# Patient Record
Sex: FEMALE | Race: WHITE | NOT HISPANIC OR LATINO | Employment: FULL TIME | URBAN - METROPOLITAN AREA
[De-identification: names, ages, dates, MRNs, and addresses within clinical notes are randomized per-mention and may not be internally consistent; named-entity substitution may affect disease eponyms.]

---

## 2020-11-30 ENCOUNTER — OFFICE VISIT (OUTPATIENT)
Dept: ENDOCRINOLOGY | Facility: CLINIC | Age: 35
End: 2020-11-30

## 2020-11-30 DIAGNOSIS — E10.65 TYPE 1 DIABETES MELLITUS WITH HYPERGLYCEMIA (HCC): Primary | ICD-10-CM

## 2020-11-30 PROCEDURE — 99442 PR PHYS/QHP TELEPHONE EVALUATION 11-20 MIN: CPT | Performed by: PHYSICIAN ASSISTANT

## 2020-11-30 RX ORDER — INSULIN GLARGINE 100 [IU]/ML
INJECTION, SOLUTION SUBCUTANEOUS
COMMUNITY
Start: 2020-10-31 | End: 2020-12-29 | Stop reason: SDUPTHER

## 2020-11-30 RX ORDER — INSULIN LISPRO 100 [IU]/ML
INJECTION, SOLUTION INTRAVENOUS; SUBCUTANEOUS
COMMUNITY
Start: 2020-10-04 | End: 2021-01-30 | Stop reason: SDUPTHER

## 2020-11-30 NOTE — PROGRESS NOTES
You have chosen to receive care through a telephone visit. Do you consent to use a telephone visit for your medical care today? Yes         Office Note      Date: 2020  Patient Name: Anisha Brantley  MRN: [id number]  : 1985    Chief Complaint   Patient presents with   • Diabetes       History of Present Illness:   Anisha Brantley is a 35 y.o. female who presents today for type 1 diabetes.  She has been participating in diabetes research studies.  She remains on basal/bolus insulin.  She has been using the Dexcom G6 off and on.  She reports this is expensive for her, but plans to continue.  She asks about other CGM options.  She asks about insulin pumps.  She is testing FSBS 4x per day if not using Dexcom.  She has noted higher BG.  She feels that at least part of this is due to stress.  She notes increasing BG in morning if she doesn't eat breakfast.  She reports having shingles about a month ago.  She denies any problems with her feet.  Eye exam past due.    Subjective      Review of Systems:   Review of Systems   Constitutional: Negative for appetite change, chills, fatigue, fever and unexpected weight change.   Respiratory: Negative for cough, shortness of breath and wheezing.    Cardiovascular: Negative for chest pain, palpitations and leg swelling.   Gastrointestinal: Negative for abdominal pain, constipation, diarrhea, nausea and vomiting.   Endocrine: Negative for cold intolerance, heat intolerance, polydipsia, polyphagia and polyuria.   Neurological: Negative for tremors, syncope, weakness, numbness and headaches.       The following portions of the patient's history were reviewed and updated as appropriate: allergies, current medications, past family history, past medical history, past social history, past surgical history and problem list.    Objective     There were no vitals filed for this visit.  There is no height or weight on file to calculate BMI.    Physical Exam      Current Outpatient  Medications   Medication Instructions   • HumaLOG KwikPen 100 UNIT/ML solution pen-injector Inject 1 unit per 5-6g carbs with meals, correction 1 unit per 25 above 120; max 30 units daily   • Lantus SoloStar 100 UNIT/ML injection pen 25 units at bedtime       Assessment / Plan      Assessment & Plan:  1. Type 1 diabetes mellitus with hyperglycemia (CMS/[facility])  She has noted more hyperglycemia.  Fasting BG okay.  She will try to eat breakfast consistently.  Discussed current insulin pumps, CGM, and hybrid closed-loop systems.  She will consider options.  She will continue Dexcom for now.  Will get her set up for sharing with our clinic through [facility].  Check labs - she will come in fasting within the next couple of weeks.  Will notify her of results.  - Comprehensive Metabolic Panel; Future  - Microalbumin / Creatinine Urine Ratio - Urine, Clean Catch; Future  - Lipid Panel; Future  - Hemoglobin A1c; Future  - TSH; Future      20 minutes spent on phone with patient.     Return in about 3 months (around 2/28/2021) for Next scheduled follow up.     VITA Sky  11/30/2020

## 2020-12-29 RX ORDER — INSULIN GLARGINE 100 [IU]/ML
30 INJECTION, SOLUTION SUBCUTANEOUS NIGHTLY
Qty: 15 ML | Refills: 1 | Status: SHIPPED | OUTPATIENT
Start: 2020-12-29 | End: 2021-01-08 | Stop reason: SDUPTHER

## 2021-01-08 RX ORDER — INSULIN GLARGINE 100 [IU]/ML
30 INJECTION, SOLUTION SUBCUTANEOUS NIGHTLY
Qty: 15 ML | Refills: 1 | Status: SHIPPED | OUTPATIENT
Start: 2021-01-08 | End: 2021-07-21

## 2021-01-30 DIAGNOSIS — E10.65 TYPE 1 DIABETES MELLITUS WITH HYPERGLYCEMIA (HCC): Primary | Chronic | ICD-10-CM

## 2021-01-30 RX ORDER — INSULIN LISPRO 100 [IU]/ML
INJECTION, SOLUTION INTRAVENOUS; SUBCUTANEOUS
Qty: 15 ML | Refills: 5 | Status: SHIPPED | OUTPATIENT
Start: 2021-01-30 | End: 2021-12-20

## 2021-05-26 RX ORDER — PROCHLORPERAZINE 25 MG/1
SUPPOSITORY RECTAL
Qty: 3 EACH | Refills: 2 | Status: SHIPPED | OUTPATIENT
Start: 2021-05-26 | End: 2021-10-01 | Stop reason: SDUPTHER

## 2021-06-01 RX ORDER — PROCHLORPERAZINE 25 MG/1
1 SUPPOSITORY RECTAL
Qty: 1 EACH | Refills: 0 | Status: SHIPPED | OUTPATIENT
Start: 2021-06-01 | End: 2021-10-01 | Stop reason: SDUPTHER

## 2021-06-08 ENCOUNTER — OFFICE VISIT (OUTPATIENT)
Dept: ENDOCRINOLOGY | Facility: CLINIC | Age: 36
End: 2021-06-08

## 2021-06-08 VITALS
OXYGEN SATURATION: 98 % | BODY MASS INDEX: 21.33 KG/M2 | HEART RATE: 100 BPM | DIASTOLIC BLOOD PRESSURE: 62 MMHG | SYSTOLIC BLOOD PRESSURE: 124 MMHG | HEIGHT: 69 IN | WEIGHT: 144 LBS

## 2021-06-08 DIAGNOSIS — E10.65 TYPE 1 DIABETES MELLITUS WITH HYPERGLYCEMIA (HCC): Primary | ICD-10-CM

## 2021-06-08 DIAGNOSIS — E10.65 UNCONTROLLED TYPE 1 DIABETES MELLITUS WITH HYPERGLYCEMIA (HCC): ICD-10-CM

## 2021-06-08 LAB
EXPIRATION DATE: ABNORMAL
EXPIRATION DATE: NORMAL
GLUCOSE BLDC GLUCOMTR-MCNC: 222 MG/DL (ref 70–130)
HBA1C MFR BLD: 9.4 %
Lab: ABNORMAL
Lab: NORMAL

## 2021-06-08 PROCEDURE — 82947 ASSAY GLUCOSE BLOOD QUANT: CPT | Performed by: INTERNAL MEDICINE

## 2021-06-08 PROCEDURE — 83036 HEMOGLOBIN GLYCOSYLATED A1C: CPT | Performed by: INTERNAL MEDICINE

## 2021-06-08 PROCEDURE — 99214 OFFICE O/P EST MOD 30 MIN: CPT | Performed by: INTERNAL MEDICINE

## 2021-06-08 NOTE — ASSESSMENT & PLAN NOTE
a1c is worse  She  Needs to use a sensor more consistently. So I gave her sample of  Jessica to use.

## 2021-06-08 NOTE — PROGRESS NOTES
"     Office Note      Date: 2021  Patient Name: Anisha Brantley  MRN: [id number]  : 1985    Chief Complaint   Patient presents with   • Diabetes       History of Present Illness:   Anisha Brantley is a 35 y.o. female who presents for Diabetes - type 1    Hemoglobin A1C   Date Value Ref Range Status   2021 9.4 % Final     She is taking at least 4 shots per day.  She uses dexcom to check bg 288 times per day.  But  It is 320$ per month and she cannot afford to do that all the time.  She is unable to tell when her sugars are low.  She  Has lows after meals when she  Takes extra insulin after meals.    She is due for an eye exam.  She  Is scheduled for fasting  Labs in the fall  Feet are fine.     Subjective            Review of Systems:   Review of Systems   Constitutional: Negative.    HENT: Negative.    Eyes: Negative.    Respiratory: Negative.    Cardiovascular: Negative.    Gastrointestinal: Negative.        The following portions of the patient's history were reviewed and updated as appropriate: allergies, current medications, past family history, past medical history, past social history, past surgical history and problem list.    Objective     Visit Vitals  /62 (BP Location: Left arm, Patient Position: Sitting, Cuff Size: Adult)   Pulse 100   Ht 175.3 cm (69\")   Wt 65.3 kg (144 lb)   SpO2 98%   BMI 21.27 kg/m²       Labs:    CMP  No results found for: GLUCOSE, BUN, CREATININE, EGFRIFNONA, EGFRIFAFRI, BCR, K, CO2, CALCIUM, PROTENTOTREF, LABIL2, BILIRUBIN, AST, ALT     CBC w/DIFF  No results found for: WBC, RBC, HGB, HCT, MCV, MCH, MCHC, RDW, RDWSD, MPV, PLT, NEUTRORELPCT, LYMPHORELPCT, MONORELPCT, EOSRELPCT, BASORELPCT, AUTOIGPER, NEUTROABS, LYMPHSABS, MONOSABS, EOSABS, BASOSABS, AUTOIGNUM, NRBC    Physical Exam:  Physical Exam  Vitals reviewed.   Constitutional:       Appearance: Normal appearance.   Neurological:      Mental Status: She is alert.   Psychiatric:         Thought Content: " Thought content normal.         Judgment: Judgment normal.         Assessment / Plan      Assessment & Plan:  Problem List Items Addressed This Visit        Other    RESOLVED: Type 1 diabetes mellitus (CMS/[facility]) - Primary (Chronic)    Overview     Diagnosed 2004         Relevant Medications    Lantus SoloStar 100 UNIT/ML injection pen    HumaLOG KwikPen 100 UNIT/ML solution pen-injector    Other Relevant Orders    POC Glycosylated Hemoglobin (Hb A1C) (Completed)    POC Glucose, Blood (Completed)    Uncontrolled type 1 diabetes mellitus with hyperglycemia (CMS/[facility])    Current Assessment & Plan     a1c is worse  She  Needs to use a sensor more consistently. So I gave her sample of  Jessica to use.           Relevant Medications    Lantus SoloStar 100 UNIT/ML injection pen    HumaLOG KwikPen 100 UNIT/ML solution pen-injector           Guzman Vallejo MD   06/08/2021

## 2021-06-10 ENCOUNTER — TELEPHONE (OUTPATIENT)
Dept: ENDOCRINOLOGY | Facility: CLINIC | Age: 36
End: 2021-06-10

## 2021-06-10 RX ORDER — FLUCONAZOLE 150 MG/1
150 TABLET ORAL ONCE
Qty: 1 TABLET | Refills: 0 | Status: SHIPPED | OUTPATIENT
Start: 2021-06-10 | End: 2021-06-10

## 2021-06-10 NOTE — TELEPHONE ENCOUNTER
PATIENT IS REQUESTING AN RX FOR DIFLUCAN. SHE STATES THAT SHE USED TO BE PRESCRIBED THIS BY DR JACKSON BUT NEVER USED IT SO DIDN'T REFILL BUT IS NEEDING IT NOW. ELVIS SHEETS

## 2021-06-14 ENCOUNTER — TELEPHONE (OUTPATIENT)
Dept: ENDOCRINOLOGY | Facility: CLINIC | Age: 36
End: 2021-06-14

## 2021-06-14 NOTE — TELEPHONE ENCOUNTER
PT CALLED STATING THAT THE DIFLUCAN WE SENT IN IS NOT WORKING. PT REQUESTED TO SPEAK W/ SOMEONE IN REGARDS TO THIS. PLEASE REACH OUT TO PT AT EARLIEST CONVENIENCE. THANK YOU

## 2021-07-16 NOTE — TELEPHONE ENCOUNTER
PLEASE CALL IN THE [facility] IN [place]    PTS NUMBER IF YOU HAVE QUESTIONS  [phone]

## 2021-07-21 RX ORDER — INSULIN GLARGINE 100 [IU]/ML
30 INJECTION, SOLUTION SUBCUTANEOUS NIGHTLY
Qty: 15 ML | Refills: 5 | Status: SHIPPED | OUTPATIENT
Start: 2021-07-21 | End: 2022-08-24

## 2021-07-21 NOTE — TELEPHONE ENCOUNTER
PT CALLED IN REGARDS TO REQUEST FROM [facility] FOR LANTUS PENS. PT STATED SHE IS OUT OF MEDS. PLEASE SEND IN AT EARLIEST CONVENIENCE. THANK YOU

## 2021-10-01 ENCOUNTER — PRIOR AUTHORIZATION (OUTPATIENT)
Dept: ENDOCRINOLOGY | Facility: CLINIC | Age: 36
End: 2021-10-01

## 2021-10-01 ENCOUNTER — TELEPHONE (OUTPATIENT)
Dept: ENDOCRINOLOGY | Facility: CLINIC | Age: 36
End: 2021-10-01

## 2021-10-01 RX ORDER — PROCHLORPERAZINE 25 MG/1
SUPPOSITORY RECTAL
Qty: 3 EACH | Refills: 2 | Status: SHIPPED | OUTPATIENT
Start: 2021-10-01 | End: 2022-08-05

## 2021-10-01 RX ORDER — PROCHLORPERAZINE 25 MG/1
1 SUPPOSITORY RECTAL SEE ADMIN INSTRUCTIONS
Qty: 1 EACH | Refills: 0 | Status: SHIPPED | OUTPATIENT
Start: 2021-10-01

## 2021-10-01 RX ORDER — PROCHLORPERAZINE 25 MG/1
1 SUPPOSITORY RECTAL
Qty: 1 EACH | Refills: 0 | Status: SHIPPED | OUTPATIENT
Start: 2021-10-01 | End: 2022-02-08

## 2021-10-01 NOTE — TELEPHONE ENCOUNTER
PATIENT NEEDS PRIOR AUTH FOR DEXCOM G6. HER INSURANCE WILL COVER THE DEXCOM G6 WITH PRIOR AUTH FOR ALL THE DEXCOM SUPPLIES. PHONE NUMBER [phone]

## 2021-10-01 NOTE — TELEPHONE ENCOUNTER
Approvedtoday  PA Case: [id number], Status: Approved, Coverage Starts on: 10/1/2021 12:00:00 AM, Coverage Ends on: 10/1/2022 12:00:00 AM.  Drug  Dexcom G6  device  Form  [employer] Form (2017 NCPDP)    Available without authorization.  Drug  Dexcom G6 Transmitter  Form  [employer] Electronic PA Form (2017 NCPDP)    Available without authorization.  Drug  Dexcom G6 Sensor  Form  [employer] Form (2017 NCPDP)

## 2021-10-04 ENCOUNTER — LAB (OUTPATIENT)
Dept: LAB | Facility: HOSPITAL | Age: 36
End: 2021-10-04

## 2021-10-04 ENCOUNTER — OFFICE VISIT (OUTPATIENT)
Dept: ENDOCRINOLOGY | Facility: CLINIC | Age: 36
End: 2021-10-04

## 2021-10-04 VITALS
SYSTOLIC BLOOD PRESSURE: 110 MMHG | WEIGHT: 146 LBS | HEIGHT: 69 IN | HEART RATE: 98 BPM | DIASTOLIC BLOOD PRESSURE: 80 MMHG | OXYGEN SATURATION: 98 % | BODY MASS INDEX: 21.62 KG/M2

## 2021-10-04 DIAGNOSIS — E10.65 UNCONTROLLED TYPE 1 DIABETES MELLITUS WITH HYPERGLYCEMIA (HCC): ICD-10-CM

## 2021-10-04 DIAGNOSIS — E10.65 TYPE 1 DIABETES MELLITUS WITH HYPERGLYCEMIA (HCC): ICD-10-CM

## 2021-10-04 DIAGNOSIS — E10.65 UNCONTROLLED TYPE 1 DIABETES MELLITUS WITH HYPERGLYCEMIA (HCC): Primary | ICD-10-CM

## 2021-10-04 LAB
EXPIRATION DATE: ABNORMAL
EXPIRATION DATE: NORMAL
GLUCOSE BLDC GLUCOMTR-MCNC: 161 MG/DL (ref 70–130)
HBA1C MFR BLD: 9 %
Lab: ABNORMAL
Lab: NORMAL

## 2021-10-04 PROCEDURE — 99213 OFFICE O/P EST LOW 20 MIN: CPT | Performed by: INTERNAL MEDICINE

## 2021-10-04 PROCEDURE — 82947 ASSAY GLUCOSE BLOOD QUANT: CPT | Performed by: INTERNAL MEDICINE

## 2021-10-04 PROCEDURE — 82570 ASSAY OF URINE CREATININE: CPT

## 2021-10-04 PROCEDURE — 84443 ASSAY THYROID STIM HORMONE: CPT

## 2021-10-04 PROCEDURE — 82043 UR ALBUMIN QUANTITATIVE: CPT

## 2021-10-04 PROCEDURE — 2033F EYE IMG VALID W/O RTNOPTHY: CPT | Performed by: INTERNAL MEDICINE

## 2021-10-04 PROCEDURE — 80053 COMPREHEN METABOLIC PANEL: CPT

## 2021-10-04 PROCEDURE — 83036 HEMOGLOBIN GLYCOSYLATED A1C: CPT | Performed by: INTERNAL MEDICINE

## 2021-10-04 PROCEDURE — 92250 FUNDUS PHOTOGRAPHY W/I&R: CPT | Performed by: INTERNAL MEDICINE

## 2021-10-04 NOTE — PROGRESS NOTES
"     Office Note      Date: 10/04/2021  Patient Name: Anisha Brantley  MRN: [id number]  : 1985    Chief Complaint   Patient presents with   • Diabetes       History of Present Illness:   Anisha Brantley is a 35 y.o. female who presents for Diabetes - type 1.  She just got started back on her dexcom.  She is  Really stressed. Her  has severe [place] that  Is not responding to treatment.    Last A1c:  Hemoglobin A1C   Date Value Ref Range Status   10/04/2021 9.0 % Final       Changes in health since last visit: see above . Last eye exam due - does not have an eye doctor. .    Subjective       Review of Systems:   Review of Systems   Constitutional: Negative.    HENT: Negative.    Eyes: Negative.    Respiratory: Negative.        The following portions of the patient's history were reviewed and updated as appropriate: allergies, current medications, past family history, past medical history, past social history, past surgical history and problem list.    Objective     Visit Vitals  /80   Pulse 98   Ht 175.3 cm (69\")   Wt 66.2 kg (146 lb)   SpO2 98%   BMI 21.56 kg/m²       Labs:    CMP  No results found for: GLUCOSE, BUN, CREATININE, EGFRIFNONA, EGFRIFAFRI, BCR, K, CO2, CALCIUM, PROTENTOTREF, LABIL2, BILIRUBIN, AST, ALT     CBC w/DIFF  No results found for: WBC, RBC, HGB, HCT, MCV, MCH, MCHC, RDW, RDWSD, MPV, PLT, NEUTRORELPCT, LYMPHORELPCT, MONORELPCT, EOSRELPCT, BASORELPCT, AUTOIGPER, NEUTROABS, LYMPHSABS, MONOSABS, EOSABS, BASOSABS, AUTOIGNUM, NRBC    Physical Exam:  Physical Exam  Vitals reviewed.   Constitutional:       Appearance: Normal appearance. She is normal weight.   Cardiovascular:      Pulses:           Dorsalis pedis pulses are 2+ on the right side and 2+ on the left side.        Posterior tibial pulses are 2+ on the right side and 2+ on the left side.   Musculoskeletal:      Right foot: Normal range of motion. No deformity, bunion, Charcot foot, foot drop or prominent metatarsal " heads.      Left foot: Normal range of motion. No deformity, bunion, Charcot foot, foot drop or prominent metatarsal heads.   Feet:      Right foot:      Protective Sensation: 5 sites tested. 5 sites sensed.      Skin integrity: Skin integrity normal.      Toenail Condition: Right toenails are normal.      Left foot:      Protective Sensation: 5 sites tested. 5 sites sensed.      Skin integrity: Skin integrity normal.      Toenail Condition: Left toenails are normal.      Comments: Diabetic Foot Exam Performed and Monofilament Test Performed    Neurological:      Mental Status: She is alert.   Psychiatric:         Mood and Affect: Mood normal.         Thought Content: Thought content normal.         Judgment: Judgment normal.          Assessment / Plan      Assessment & Plan:  Problem List Items Addressed This Visit        Other    Uncontrolled type 1 diabetes mellitus with hyperglycemia (HCC) - Primary    Current Assessment & Plan     Diabetes is improving with treatment.   Continue current treatment regimen.  Diabetes will be reassessed in 6 months     Will get retinal photo today .         Relevant Medications    HumaLOG KwikPen 100 UNIT/ML solution pen-injector    Lantus SoloStar 100 UNIT/ML injection pen    Other Relevant Orders    POC Glycosylated Hemoglobin (Hb A1C) (Completed)    POC Glucose, Blood (Completed)    Comprehensive Metabolic Panel    Microalbumin / Creatinine Urine Ratio - Urine, Clean Catch    TSH           [place] Dakota Vallejo MD   10/04/2021

## 2021-10-04 NOTE — ASSESSMENT & PLAN NOTE
Diabetes is improving with treatment.   Continue current treatment regimen.  Diabetes will be reassessed in 6 months     Will get retinal photo today . Addendum- it was normal

## 2021-10-05 LAB
ALBUMIN SERPL-MCNC: 4.5 G/DL (ref 3.5–5.2)
ALBUMIN UR-MCNC: <1.2 MG/DL
ALBUMIN/GLOB SERPL: 2 G/DL
ALP SERPL-CCNC: 62 U/L (ref 39–117)
ALT SERPL W P-5'-P-CCNC: 12 U/L (ref 1–33)
ANION GAP SERPL CALCULATED.3IONS-SCNC: 9.5 MMOL/L (ref 5–15)
AST SERPL-CCNC: 14 U/L (ref 1–32)
BILIRUB SERPL-MCNC: 1.7 MG/DL (ref 0–1.2)
BUN SERPL-MCNC: 14 MG/DL (ref 6–20)
BUN/CREAT SERPL: 18.7 (ref 7–25)
CALCIUM SPEC-SCNC: 9.1 MG/DL (ref 8.6–10.5)
CHLORIDE SERPL-SCNC: 101 MMOL/L (ref 98–107)
CO2 SERPL-SCNC: 28.5 MMOL/L (ref 22–29)
CREAT SERPL-MCNC: 0.75 MG/DL (ref 0.57–1)
CREAT UR-MCNC: 75.6 MG/DL
GFR SERPL CREATININE-BSD FRML MDRD: 87 ML/MIN/1.73
GLOBULIN UR ELPH-MCNC: 2.3 GM/DL
GLUCOSE SERPL-MCNC: 97 MG/DL (ref 65–99)
MICROALBUMIN/CREAT UR: NORMAL MG/G{CREAT}
POTASSIUM SERPL-SCNC: 3.8 MMOL/L (ref 3.5–5.2)
PROT SERPL-MCNC: 6.8 G/DL (ref 6–8.5)
SODIUM SERPL-SCNC: 139 MMOL/L (ref 136–145)
TSH SERPL DL<=0.05 MIU/L-ACNC: 2.99 UIU/ML (ref 0.27–4.2)

## 2021-12-18 DIAGNOSIS — E10.65 TYPE 1 DIABETES MELLITUS WITH HYPERGLYCEMIA (HCC): Chronic | ICD-10-CM

## 2021-12-20 RX ORDER — INSULIN LISPRO 100 [IU]/ML
INJECTION, SOLUTION INTRAVENOUS; SUBCUTANEOUS
Qty: 15 ML | Refills: 5 | Status: SHIPPED | OUTPATIENT
Start: 2021-12-20 | End: 2022-12-06 | Stop reason: SDUPTHER

## 2022-02-08 RX ORDER — PROCHLORPERAZINE 25 MG/1
SUPPOSITORY RECTAL
Qty: 1 EACH | Refills: 0 | Status: SHIPPED | OUTPATIENT
Start: 2022-02-08 | End: 2022-06-13

## 2022-04-04 ENCOUNTER — OFFICE VISIT (OUTPATIENT)
Dept: ENDOCRINOLOGY | Facility: CLINIC | Age: 37
End: 2022-04-04

## 2022-04-04 VITALS
HEART RATE: 78 BPM | DIASTOLIC BLOOD PRESSURE: 71 MMHG | OXYGEN SATURATION: 98 % | BODY MASS INDEX: 21.33 KG/M2 | WEIGHT: 144 LBS | SYSTOLIC BLOOD PRESSURE: 112 MMHG | HEIGHT: 69 IN

## 2022-04-04 DIAGNOSIS — E10.65 UNCONTROLLED TYPE 1 DIABETES MELLITUS WITH HYPERGLYCEMIA: Primary | ICD-10-CM

## 2022-04-04 LAB
EXPIRATION DATE: ABNORMAL
EXPIRATION DATE: NORMAL
GLUCOSE BLDC GLUCOMTR-MCNC: 131 MG/DL (ref 70–130)
HBA1C MFR BLD: 9.5 %
Lab: ABNORMAL
Lab: NORMAL

## 2022-04-04 PROCEDURE — 82947 ASSAY GLUCOSE BLOOD QUANT: CPT | Performed by: INTERNAL MEDICINE

## 2022-04-04 PROCEDURE — 83036 HEMOGLOBIN GLYCOSYLATED A1C: CPT | Performed by: INTERNAL MEDICINE

## 2022-04-04 PROCEDURE — 99213 OFFICE O/P EST LOW 20 MIN: CPT | Performed by: INTERNAL MEDICINE

## 2022-04-04 NOTE — PROGRESS NOTES
"     Office Note      Date: 2022  Patient Name: Anisha Brantley  MRN: [id number]  : 1985    Chief Complaint   Patient presents with   • Diabetes       History of Present Illness:   Anisha Brantley is a 36 y.o. female who presents for Diabetes type 1  She takes 4 shots per day.  Bg is check 288 times per day with dexcom.  Insulin doses are adjusted frequently based upon the readings.  Patient has occasional hypoglycemia.  Patient has been using the system during the last 3 months.  She left her reader at home   She feels bg readings are too low at night and too high after meals   Last A1c:  Hemoglobin A1C   Date Value Ref Range Status   2022 9.5 % Final       Changes in health since last visit:  None . Last eye exam  About due .    Subjective          Review of Systems:   Review of Systems   Constitutional: Negative.    HENT: Negative.    Eyes: Negative.    Respiratory: Negative.        The following portions of the patient's history were reviewed and updated as appropriate: allergies, current medications, past family history, past medical history, past social history, past surgical history and problem list.    Objective     Visit Vitals  /71   Pulse 78   Ht 175.3 cm (69\")   Wt 65.3 kg (144 lb)   SpO2 98%   BMI 21.27 kg/m²       Labs:    CMP  Lab Results   Component Value Date    GLUCOSE 97 10/04/2021    BUN 14 10/04/2021    CREATININE 0.75 10/04/2021    EGFRIFNONA 87 10/04/2021    BCR 18.7 10/04/2021    K 3.8 10/04/2021    CO2 28.5 10/04/2021    CALCIUM 9.1 10/04/2021    AST 14 10/04/2021    ALT 12 10/04/2021        CBC w/DIFF  No results found for: WBC, RBC, HGB, HCT, MCV, MCH, MCHC, RDW, RDWSD, MPV, PLT, NEUTRORELPCT, LYMPHORELPCT, MONORELPCT, EOSRELPCT, BASORELPCT, AUTOIGPER, NEUTROABS, LYMPHSABS, MONOSABS, EOSABS, BASOSABS, AUTOIGNUM, NRBC    Physical Exam:  Physical Exam  Vitals reviewed.   Constitutional:       Appearance: Normal appearance.   Neurological:      Mental Status: She is " alert.   Psychiatric:         Mood and Affect: Mood normal.         Thought Content: Thought content normal.         Judgment: Judgment normal.          Assessment / Plan      Assessment & Plan:  Problem List Items Addressed This Visit        Other    Uncontrolled type 1 diabetes mellitus with hyperglycemia (HCC) - Primary    Current Assessment & Plan     Diabetes is unchanged.   insulin was adjusted   Diabetes will be reassessed in 6 months.           Relevant Medications    Lantus SoloStar 100 UNIT/ML injection pen    HumaLOG KwikPen 100 UNIT/ML solution pen-injector    Other Relevant Orders    POC Glucose, Blood (Completed)    POC Glycosylated Hemoglobin (Hb A1C) (Completed)           Guzman Vallejo MD   04/04/2022

## 2022-06-13 RX ORDER — PROCHLORPERAZINE 25 MG/1
SUPPOSITORY RECTAL
Qty: 1 EACH | Refills: 0 | Status: SHIPPED | OUTPATIENT
Start: 2022-06-13 | End: 2022-10-13

## 2022-08-05 RX ORDER — PROCHLORPERAZINE 25 MG/1
SUPPOSITORY RECTAL
Qty: 3 EACH | Refills: 2 | Status: SHIPPED | OUTPATIENT
Start: 2022-08-05 | End: 2023-03-13

## 2022-08-24 RX ORDER — INSULIN GLARGINE 100 [IU]/ML
30 INJECTION, SOLUTION SUBCUTANEOUS NIGHTLY
Qty: 15 ML | Refills: 2 | Status: SHIPPED | OUTPATIENT
Start: 2022-08-24 | End: 2022-10-12

## 2022-10-12 ENCOUNTER — LAB (OUTPATIENT)
Dept: LAB | Facility: HOSPITAL | Age: 37
End: 2022-10-12

## 2022-10-12 ENCOUNTER — OFFICE VISIT (OUTPATIENT)
Dept: ENDOCRINOLOGY | Facility: CLINIC | Age: 37
End: 2022-10-12

## 2022-10-12 VITALS
HEIGHT: 69 IN | OXYGEN SATURATION: 98 % | SYSTOLIC BLOOD PRESSURE: 104 MMHG | HEART RATE: 112 BPM | BODY MASS INDEX: 20.14 KG/M2 | WEIGHT: 136 LBS | DIASTOLIC BLOOD PRESSURE: 78 MMHG

## 2022-10-12 DIAGNOSIS — E10.65 UNCONTROLLED TYPE 1 DIABETES MELLITUS WITH HYPERGLYCEMIA: Primary | ICD-10-CM

## 2022-10-12 DIAGNOSIS — E10.65 UNCONTROLLED TYPE 1 DIABETES MELLITUS WITH HYPERGLYCEMIA: ICD-10-CM

## 2022-10-12 LAB
ALBUMIN SERPL-MCNC: 4.2 G/DL (ref 3.5–5.2)
ALBUMIN UR-MCNC: <1.2 MG/DL
ALBUMIN/GLOB SERPL: 1.8 G/DL
ALP SERPL-CCNC: 92 U/L (ref 39–117)
ALT SERPL W P-5'-P-CCNC: 16 U/L (ref 1–33)
ANION GAP SERPL CALCULATED.3IONS-SCNC: 9 MMOL/L (ref 5–15)
AST SERPL-CCNC: 16 U/L (ref 1–32)
BILIRUB SERPL-MCNC: 0.8 MG/DL (ref 0–1.2)
BUN SERPL-MCNC: 11 MG/DL (ref 6–20)
BUN/CREAT SERPL: 14.1 (ref 7–25)
CALCIUM SPEC-SCNC: 8.9 MG/DL (ref 8.6–10.5)
CHLORIDE SERPL-SCNC: 98 MMOL/L (ref 98–107)
CHOLEST SERPL-MCNC: 160 MG/DL (ref 0–200)
CO2 SERPL-SCNC: 26 MMOL/L (ref 22–29)
CREAT SERPL-MCNC: 0.78 MG/DL (ref 0.57–1)
CREAT UR-MCNC: 48.6 MG/DL
EGFRCR SERPLBLD CKD-EPI 2021: 100.5 ML/MIN/1.73
EXPIRATION DATE: ABNORMAL
EXPIRATION DATE: NORMAL
GLOBULIN UR ELPH-MCNC: 2.4 GM/DL
GLUCOSE BLDC GLUCOMTR-MCNC: 425 MG/DL (ref 70–130)
GLUCOSE SERPL-MCNC: 402 MG/DL (ref 65–99)
HBA1C MFR BLD: 11 %
HDLC SERPL-MCNC: 79 MG/DL (ref 40–60)
LDLC SERPL CALC-MCNC: 70 MG/DL (ref 0–100)
LDLC/HDLC SERPL: 0.89 {RATIO}
Lab: ABNORMAL
Lab: NORMAL
MICROALBUMIN/CREAT UR: NORMAL MG/G{CREAT}
POTASSIUM SERPL-SCNC: 4.4 MMOL/L (ref 3.5–5.2)
PROT SERPL-MCNC: 6.6 G/DL (ref 6–8.5)
SODIUM SERPL-SCNC: 133 MMOL/L (ref 136–145)
TRIGL SERPL-MCNC: 54 MG/DL (ref 0–150)
TSH SERPL DL<=0.05 MIU/L-ACNC: 3.77 UIU/ML (ref 0.27–4.2)
VLDLC SERPL-MCNC: 11 MG/DL (ref 5–40)

## 2022-10-12 PROCEDURE — 82043 UR ALBUMIN QUANTITATIVE: CPT

## 2022-10-12 PROCEDURE — 82947 ASSAY GLUCOSE BLOOD QUANT: CPT | Performed by: INTERNAL MEDICINE

## 2022-10-12 PROCEDURE — 83036 HEMOGLOBIN GLYCOSYLATED A1C: CPT | Performed by: INTERNAL MEDICINE

## 2022-10-12 PROCEDURE — 82570 ASSAY OF URINE CREATININE: CPT

## 2022-10-12 PROCEDURE — 80061 LIPID PANEL: CPT

## 2022-10-12 PROCEDURE — 99214 OFFICE O/P EST MOD 30 MIN: CPT | Performed by: INTERNAL MEDICINE

## 2022-10-12 PROCEDURE — 80053 COMPREHEN METABOLIC PANEL: CPT

## 2022-10-12 PROCEDURE — 84443 ASSAY THYROID STIM HORMONE: CPT

## 2022-10-12 RX ORDER — INSULIN PMP CART,AUT,G6/7,CNTR
1 EACH SUBCUTANEOUS TAKE AS DIRECTED
Qty: 1 KIT | Refills: 0 | Status: SHIPPED | OUTPATIENT
Start: 2022-10-12

## 2022-10-12 RX ORDER — INSULIN GLARGINE 100 [IU]/ML
20 INJECTION, SOLUTION SUBCUTANEOUS 2 TIMES DAILY
Qty: 15 ML | Refills: 2 | Status: SHIPPED | OUTPATIENT
Start: 2022-10-12

## 2022-10-12 NOTE — ASSESSMENT & PLAN NOTE
Deteriorate  For now she needs to take more insulin.   Will split lantus and increase dose  Will start process to get omniopod pump

## 2022-10-12 NOTE — PROGRESS NOTES
"     Office Note      Date: 10/12/2022  Patient Name: Anisha Brantley  MRN: [id number]  : 1985    Chief Complaint   Patient presents with   • Diabetes       History of Present Illness:   Anisha Brantley is a 36 y.o. female who presents for Diabetes - type 1  Rx: insulin - 4 shots per day  She wears her dexcom sporadically.  When she wears it she notes persistent hyperglycemia  .     Last A1c:  Hemoglobin A1C   Date Value Ref Range Status   10/12/2022 11.0 % Final       Changes in health since last visit: none . Last eye exam up to date.    Subjective          Review of Systems:   Review of Systems   Constitutional: Positive for unexpected weight change.   HENT: Negative.    Eyes: Negative for visual disturbance.   Endocrine: Positive for polydipsia. Negative for polyuria.       The following portions of the patient's history were reviewed and updated as appropriate: allergies, current medications, past family history, past medical history, past social history, past surgical history and problem list.    Objective     Visit Vitals  /78   Pulse 112   Ht 175.3 cm (69\")   Wt 61.7 kg (136 lb)   SpO2 98%   BMI 20.08 kg/m²       Labs:    CMP  Lab Results   Component Value Date    GLUCOSE 97 10/04/2021    BUN 14 10/04/2021    CREATININE 0.75 10/04/2021    EGFRIFNONA 87 10/04/2021    BCR 18.7 10/04/2021    K 3.8 10/04/2021    CO2 28.5 10/04/2021    CALCIUM 9.1 10/04/2021    AST 14 10/04/2021    ALT 12 10/04/2021        CBC w/DIFF  No results found for: WBC, RBC, HGB, HCT, MCV, MCH, MCHC, RDW, RDWSD, MPV, PLT, NEUTRORELPCT, LYMPHORELPCT, MONORELPCT, EOSRELPCT, BASORELPCT, AUTOIGPER, NEUTROABS, LYMPHSABS, MONOSABS, EOSABS, BASOSABS, AUTOIGNUM, NRBC    Physical Exam:  Physical Exam  Vitals reviewed.   Constitutional:       Appearance: Normal appearance. She is normal weight.   Cardiovascular:      Pulses:           Dorsalis pedis pulses are 2+ on the right side and 2+ on the left side.        Posterior tibial pulses " are 2+ on the right side and 2+ on the left side.   Musculoskeletal:      Right foot: Normal range of motion. No deformity, bunion, Charcot foot, foot drop or prominent metatarsal heads.      Left foot: Normal range of motion. No deformity, bunion, Charcot foot, foot drop or prominent metatarsal heads.   Feet:      Right foot:      Protective Sensation: 5 sites tested. 5 sites sensed.      Skin integrity: Skin integrity normal.      Toenail Condition: Right toenails are normal.      Left foot:      Protective Sensation: 5 sites tested. 5 sites sensed.      Skin integrity: Skin integrity normal.      Toenail Condition: Left toenails are normal.      Comments: Diabetic Foot Exam Performed and Monofilament Test Performed    Neurological:      Mental Status: She is alert.   Psychiatric:         Mood and Affect: Mood normal.         Behavior: Behavior normal.         Thought Content: Thought content normal.         Judgment: Judgment normal.          Assessment / Plan      Assessment & Plan:  Problem List Items Addressed This Visit        Other    Uncontrolled type 1 diabetes mellitus with hyperglycemia (HCC) - Primary    Current Assessment & Plan      Deteriorate  For now she needs to take more insulin.   Will split lantus and increase dose  Will start process to get omniopod pump          Relevant Medications    HumaLOG KwikPen 100 UNIT/ML solution pen-injector    Lantus SoloStar 100 UNIT/ML injection pen    Other Relevant Orders    POC Glycosylated Hemoglobin (Hb A1C) (Completed)    POC Glucose, Blood (Completed)    Comprehensive Metabolic Panel    Lipid Panel    Microalbumin / Creatinine Urine Ratio - Urine, Clean Catch    TSH        Guzman Vallejo MD   10/12/2022

## 2022-10-13 ENCOUNTER — PRIOR AUTHORIZATION (OUTPATIENT)
Dept: ENDOCRINOLOGY | Facility: CLINIC | Age: 37
End: 2022-10-13

## 2022-10-13 RX ORDER — PROCHLORPERAZINE 25 MG/1
SUPPOSITORY RECTAL
Qty: 1 EACH | Refills: 3 | Status: SHIPPED | OUTPATIENT
Start: 2022-10-13

## 2022-10-13 NOTE — TELEPHONE ENCOUNTER
Approvedtoday  PA Case: [id number], Status: Approved, Coverage Starts on: 10/13/2022 12:00:00 AM, Coverage Ends on: 10/13/2023 12:00:00 AM.  Drug  Dexcom G6 Transmitter  Form  [employer] PA Form (2017 NCPDP)

## 2022-10-31 ENCOUNTER — PRIOR AUTHORIZATION (OUTPATIENT)
Dept: ENDOCRINOLOGY | Facility: CLINIC | Age: 37
End: 2022-10-31

## 2022-12-06 DIAGNOSIS — E10.65 UNCONTROLLED TYPE 1 DIABETES MELLITUS WITH HYPERGLYCEMIA: Primary | ICD-10-CM

## 2022-12-06 DIAGNOSIS — E10.65 TYPE 1 DIABETES MELLITUS WITH HYPERGLYCEMIA: Chronic | ICD-10-CM

## 2022-12-06 RX ORDER — INSULIN LISPRO 100 [IU]/ML
INJECTION, SOLUTION INTRAVENOUS; SUBCUTANEOUS
Qty: 15 ML | Refills: 5 | Status: SHIPPED | OUTPATIENT
Start: 2022-12-06

## 2022-12-21 ENCOUNTER — DOCUMENTATION (OUTPATIENT)
Dept: DIABETES SERVICES | Facility: HOSPITAL | Age: 37
End: 2022-12-21

## 2022-12-21 NOTE — PLAN OF CARE
60 minutes of diabetes education was completed today. Patient was accompanied by her . Addressed patients specific questions, discussed insulin, purpose and timing. Discussed healthy nutrition and importance of three meals daily with bedtime snack. Recommendations per providers instruction. Patient voices much satisfaction with encounter and less stress. I encouraged her to reach out for further support as needed and she will be contacted for a phone follow up next week.. Thank you for this opportunity.

## 2023-01-26 ENCOUNTER — OFFICE VISIT (OUTPATIENT)
Dept: ENDOCRINOLOGY | Facility: CLINIC | Age: 38
End: 2023-01-26
Payer: COMMERCIAL

## 2023-01-26 VITALS
BODY MASS INDEX: 20.83 KG/M2 | DIASTOLIC BLOOD PRESSURE: 72 MMHG | SYSTOLIC BLOOD PRESSURE: 108 MMHG | HEIGHT: 69 IN | WEIGHT: 140.6 LBS | HEART RATE: 80 BPM | OXYGEN SATURATION: 98 %

## 2023-01-26 DIAGNOSIS — E10.65 UNCONTROLLED TYPE 1 DIABETES MELLITUS WITH HYPERGLYCEMIA: Primary | ICD-10-CM

## 2023-01-26 LAB
EXPIRATION DATE: ABNORMAL
EXPIRATION DATE: NORMAL
GLUCOSE BLDC GLUCOMTR-MCNC: 150 MG/DL (ref 70–130)
HBA1C MFR BLD: 8.8 %
Lab: ABNORMAL
Lab: NORMAL

## 2023-01-26 PROCEDURE — 82947 ASSAY GLUCOSE BLOOD QUANT: CPT | Performed by: INTERNAL MEDICINE

## 2023-01-26 PROCEDURE — 83036 HEMOGLOBIN GLYCOSYLATED A1C: CPT | Performed by: INTERNAL MEDICINE

## 2023-01-26 PROCEDURE — 99213 OFFICE O/P EST LOW 20 MIN: CPT | Performed by: INTERNAL MEDICINE

## 2023-01-26 NOTE — PROGRESS NOTES
"     Office Note      Date: 2023  Patient Name: Anisha Brantley  MRN: [id number]  : 1985    Chief Complaint   Patient presents with   • Diabetes       History of Present Illness:   Anisha Brantley is a 37 y.o. female who presents for Diabetes type 1.   Current RX insulin in an omnipod.  Has been on the pump system for 3 weeks. Blood sugars are much lower           Last A1c:  Hemoglobin A1C   Date Value Ref Range Status   2023 8.8 % Final       Changes in health since last visit: see above . Last eye exam up to date.    Subjective              Review of Systems:   Review of Systems   All other systems reviewed and are negative.      The following portions of the patient's history were reviewed and updated as appropriate: allergies, current medications, past family history, past medical history, past social history, past surgical history and problem list.    Objective     Visit Vitals  /72   Pulse 80   Ht 175.3 cm (69\")   Wt 63.8 kg (140 lb 9.6 oz)   SpO2 98%   BMI 20.76 kg/m²           Physical Exam:  Physical Exam  Vitals reviewed.   Constitutional:       Appearance: Normal appearance.   Neurological:      Mental Status: She is alert.   Psychiatric:         Mood and Affect: Mood normal.         Behavior: Behavior normal.         Thought Content: Thought content normal.         Judgment: Judgment normal.          Assessment / Plan      Assessment & Plan:  Problem List Items Addressed This Visit        Other    Uncontrolled type 1 diabetes mellitus with hyperglycemia (HCC) - Primary    Current Assessment & Plan     Much improved. No changes are needed at this improved          Relevant Medications    Lantus SoloStar 100 UNIT/ML injection pen    HumaLOG KwikPen 100 UNIT/ML solution pen-injector    insulin lispro (HumaLOG) 100 UNIT/ML injection    Other Relevant Orders    POC Glucose, Blood (Completed)    POC Glycosylated Hemoglobin (Hb A1C) (Completed)        Guzman Vallejo MD "   01/26/2023

## 2023-02-13 ENCOUNTER — TELEPHONE (OUTPATIENT)
Dept: ENDOCRINOLOGY | Facility: CLINIC | Age: 38
End: 2023-02-13
Payer: COMMERCIAL

## 2023-02-13 RX ORDER — INSULIN PMP CART,AUT,G6/7,CNTR
1 EACH SUBCUTANEOUS
Qty: 30 EACH | Refills: 3 | Status: SHIPPED | OUTPATIENT
Start: 2023-02-13

## 2023-02-13 NOTE — TELEPHONE ENCOUNTER
PATIENT IS NEEDING A PRESCRIPTION FOR OMNI POD 5 PODS WHICH NEEDS TO BE SENT TO [facility] IN [place]. PATIENTS NUMBER [phone]

## 2023-02-15 ENCOUNTER — PRIOR AUTHORIZATION (OUTPATIENT)
Dept: ENDOCRINOLOGY | Facility: CLINIC | Age: 38
End: 2023-02-15
Payer: COMMERCIAL

## 2023-02-15 NOTE — TELEPHONE ENCOUNTER
Anisha Brantley Key: STEPHANIHJKENNETH GORMAN Case ID: [id number] - Rx #: [id number] help? Call us at [phone]  Outcome  Approvedon February 14  PA Case: [id number], Status: Approved, Coverage Starts on: 2/14/2023 12:00:00 AM, Coverage Ends on: 2/14/2024 12:00:00 AM.  Drug  Omnipod 5 G6 Pod (Gen 5)  Form  [employer] PA Form (2017 NCPDP)

## 2023-03-13 RX ORDER — PROCHLORPERAZINE 25 MG/1
SUPPOSITORY RECTAL
Qty: 3 EACH | Refills: 2 | Status: SHIPPED | OUTPATIENT
Start: 2023-03-13 | End: 2023-03-13

## 2023-03-13 RX ORDER — PROCHLORPERAZINE 25 MG/1
SUPPOSITORY RECTAL
Qty: 3 EACH | Refills: 2 | Status: SHIPPED | OUTPATIENT
Start: 2023-03-13

## 2023-03-30 ENCOUNTER — TELEPHONE (OUTPATIENT)
Dept: ENDOCRINOLOGY | Facility: CLINIC | Age: 38
End: 2023-03-30
Payer: COMMERCIAL

## 2023-03-30 NOTE — TELEPHONE ENCOUNTER
----- Message from Angela Boateng sent at 3/30/2023  4:26 PM EDT -----  Regarding: FW: Pump issue   Contact: [phone]    ----- Message -----  From: Anisha Brantley  Sent: 3/30/2023   2:45 PM EDT  To: [facility]  Subject: Pump issue                                       Hi Dr Rene    I’ve had high blood sugar all day. It began high and I think that is because last night I needed both a Dexcom sensor change and a Omnipod pod change at the same time and fell asleep before the 2 hour Dexcom warmup was complete. This resulted in my Omnipod switching to manual mode in the night. I fixed it back to auto this morning. Several times it has just said “HIGH” - but it seems like it’s only administering when I bolus for eating? The “check glucose” is greyed out so I don’t have the option of hitting that in order for it to register that my sugar is high I need it to come down.   Maybe it will finally fall. But it’s worrying me a bit. Can you see what I’m talking about on your end?

## 2023-05-08 RX ORDER — PROCHLORPERAZINE 25 MG/1
1 SUPPOSITORY RECTAL CONTINUOUS
Qty: 1 EACH | Refills: 3 | Status: SHIPPED | OUTPATIENT
Start: 2023-05-08

## 2023-05-08 NOTE — TELEPHONE ENCOUNTER
Rx Refill Note  Requested Prescriptions     Pending Prescriptions Disp Refills   • Continuous Blood Gluc Transmit (Dexcom G6 Transmitter) misc 1 each 3          Last office visit with prescribing clinician: 1/26/2023     Next office visit with prescribing clinician: 7/28/2023         Kasia Vigil MA  05/08/23, 10:18 EDT

## 2023-05-15 RX ORDER — INSULIN PMP CART,AUT,G6/7,CNTR
1 EACH SUBCUTANEOUS
Qty: 30 EACH | Refills: 3 | Status: SHIPPED | OUTPATIENT
Start: 2023-05-15

## 2023-07-28 ENCOUNTER — OFFICE VISIT (OUTPATIENT)
Dept: ENDOCRINOLOGY | Facility: CLINIC | Age: 38
End: 2023-07-28
Payer: COMMERCIAL

## 2023-07-28 VITALS
WEIGHT: 149.4 LBS | SYSTOLIC BLOOD PRESSURE: 102 MMHG | BODY MASS INDEX: 22.13 KG/M2 | HEART RATE: 89 BPM | OXYGEN SATURATION: 99 % | HEIGHT: 69 IN | DIASTOLIC BLOOD PRESSURE: 62 MMHG

## 2023-07-28 DIAGNOSIS — E10.65 UNCONTROLLED TYPE 1 DIABETES MELLITUS WITH HYPERGLYCEMIA: Primary | ICD-10-CM

## 2023-07-28 LAB
EXPIRATION DATE: NORMAL
EXPIRATION DATE: NORMAL
GLUCOSE BLDC GLUCOMTR-MCNC: 97 MG/DL (ref 70–130)
HBA1C MFR BLD: 7.5 %
Lab: NORMAL
Lab: NORMAL

## 2023-07-28 NOTE — PROGRESS NOTES
"     Office Note      Date: 2023  Patient Name: Anisha Brantley  MRN: [id number]  : 1985    Chief Complaint   Patient presents with    Diabetes     Type 1       History of Present Illness:   Anisha Brantley is a 37 y.o. female who presents for Diabetes type 1.   Current RX - omnipod 5 with dexcom     Bg is checked 288 times per day with dexcom.  Insulin doses are adjusted frequently based upon the readings.  Patient has occasional hypoglycemia.  Patient has been using the system during the last 3 months.       Last A1c:  Hemoglobin A1C   Date Value Ref Range Status   2023 7.5 % Final       Changes in health since last visit: none . Last eye exam up to date.    Subjective            Review of Systems:   Review of Systems   Constitutional: Negative.    HENT: Negative.     Eyes: Negative.    Respiratory: Negative.       The following portions of the patient's history were reviewed and updated as appropriate: allergies, current medications, past family history, past medical history, past social history, past surgical history, and problem list.    Objective     Visit Vitals  /62 (BP Location: Left arm, Patient Position: Sitting, Cuff Size: Adult)   Pulse 89   Ht 175.3 cm (69\")   Wt 67.8 kg (149 lb 6.4 oz)   SpO2 99%   BMI 22.06 kg/m²           Physical Exam:  Physical Exam  Vitals reviewed.   Constitutional:       Appearance: Normal appearance.   Neurological:      Mental Status: She is alert.   Psychiatric:         Mood and Affect: Mood normal.         Behavior: Behavior normal.         Thought Content: Thought content normal.         Judgment: Judgment normal.        Assessment / Plan      Assessment & Plan:  Problem List Items Addressed This Visit          Other    Uncontrolled type 1 diabetes mellitus with hyperglycemia - Primary    Current Assessment & Plan     Diabetes is improving with treatment.   Continue current treatment regimen.  Diabetes will be reassessed in 6 months  I changed her " target to 110 and her pump will now correct about 110. This should improve her A1c .         Relevant Medications    Lantus SoloStar 100 UNIT/ML injection pen    HumaLOG KwikPen 100 UNIT/ML solution pen-injector    insulin lispro (HumaLOG) 100 UNIT/ML injection    Other Relevant Orders    POC Glycosylated Hemoglobin (Hb A1C) (Completed)    POC Glucose, Blood (Completed)        Guzman Vallejo MD   07/28/2023

## 2023-07-28 NOTE — ASSESSMENT & PLAN NOTE
Diabetes is improving with treatment.   Continue current treatment regimen.  Diabetes will be reassessed in 6 months  I changed her target to 110 and her pump will now correct about 110. This should improve her A1c .

## 2023-09-18 ENCOUNTER — PRIOR AUTHORIZATION (OUTPATIENT)
Dept: ENDOCRINOLOGY | Facility: CLINIC | Age: 38
End: 2023-09-18
Payer: COMMERCIAL

## 2023-09-18 NOTE — TELEPHONE ENCOUNTER
SAURAV YORK Key: [id number] - PA Case ID: [id number] help? Call us at [phone]  Outcome  Approvedtoday  PA Case: [id number], Status: Approved, Coverage Starts on: 9/18/2023 12:00:00 AM, Coverage Ends on: 9/17/2024 12:00:00 AM.  Drug  Dexcom G6 Transmitter  Form  [employer] PA Form (2017 [employer])

## 2023-12-15 RX ORDER — INSULIN LISPRO 100 [IU]/ML
INJECTION, SOLUTION INTRAVENOUS; SUBCUTANEOUS
Qty: 30 ML | Refills: 5 | Status: SHIPPED | OUTPATIENT
Start: 2023-12-15

## 2023-12-15 NOTE — TELEPHONE ENCOUNTER
Rx Refill Note  Requested Prescriptions     Pending Prescriptions Disp Refills    HumaLOG 100 UNIT/ML injection [Pharmacy Med Name: HUMALOG 100 UNIT/ML VIAL] 30 mL      Sig: USE AS DIRECTED IN INSULIN PUMP. MAX DAILY DOSE 100 UNITS.          Last office visit with prescribing clinician: 7/28/2023     Next office visit with prescribing clinician: 2/6/2024         Kasia Vigil MA  12/15/23, 10:22 EST

## 2024-01-11 RX ORDER — PROCHLORPERAZINE 25 MG/1
SUPPOSITORY RECTAL
Qty: 3 EACH | Refills: 2 | Status: SHIPPED | OUTPATIENT
Start: 2024-01-11

## 2024-01-11 NOTE — TELEPHONE ENCOUNTER
Rx Refill Note  Requested Prescriptions     Pending Prescriptions Disp Refills    Continuous Blood Gluc Sensor (Dexcom G6 Sensor) 3 each 2     Sig: Change sensor every 10 days. Diagnoses code e 11.65          Last office visit with prescribing clinician: 7/28/2023     Next office visit with prescribing clinician: 2/6/2024         Kasia Vigil MA  01/11/24, 08:59 EST

## 2024-01-18 ENCOUNTER — PRIOR AUTHORIZATION (OUTPATIENT)
Dept: ENDOCRINOLOGY | Facility: CLINIC | Age: 39
End: 2024-01-18
Payer: COMMERCIAL

## 2024-02-06 ENCOUNTER — OFFICE VISIT (OUTPATIENT)
Dept: ENDOCRINOLOGY | Facility: CLINIC | Age: 39
End: 2024-02-06
Payer: COMMERCIAL

## 2024-02-06 VITALS
HEIGHT: 69 IN | HEART RATE: 89 BPM | DIASTOLIC BLOOD PRESSURE: 64 MMHG | BODY MASS INDEX: 22.22 KG/M2 | WEIGHT: 150 LBS | OXYGEN SATURATION: 99 % | SYSTOLIC BLOOD PRESSURE: 100 MMHG

## 2024-02-06 DIAGNOSIS — Z96.41 INSULIN PUMP IN PLACE: ICD-10-CM

## 2024-02-06 DIAGNOSIS — E10.65 UNCONTROLLED TYPE 1 DIABETES MELLITUS WITH HYPERGLYCEMIA: Primary | ICD-10-CM

## 2024-02-06 LAB
ALBUMIN SERPL-MCNC: 4.2 G/DL (ref 3.5–5.2)
ALBUMIN UR-MCNC: 2.2 MG/DL
ALBUMIN/GLOB SERPL: 1.7 G/DL
ALP SERPL-CCNC: 56 U/L (ref 39–117)
ALT SERPL W P-5'-P-CCNC: 10 U/L (ref 1–33)
ANION GAP SERPL CALCULATED.3IONS-SCNC: 11.4 MMOL/L (ref 5–15)
AST SERPL-CCNC: 10 U/L (ref 1–32)
BILIRUB SERPL-MCNC: 1.5 MG/DL (ref 0–1.2)
BUN SERPL-MCNC: 13 MG/DL (ref 6–20)
BUN/CREAT SERPL: 15.7 (ref 7–25)
CALCIUM SPEC-SCNC: 8.9 MG/DL (ref 8.6–10.5)
CHLORIDE SERPL-SCNC: 102 MMOL/L (ref 98–107)
CHOLEST SERPL-MCNC: 150 MG/DL (ref 0–200)
CO2 SERPL-SCNC: 25.6 MMOL/L (ref 22–29)
CREAT SERPL-MCNC: 0.83 MG/DL (ref 0.57–1)
CREAT UR-MCNC: 260.7 MG/DL
EGFRCR SERPLBLD CKD-EPI 2021: 92.1 ML/MIN/1.73
EXPIRATION DATE: ABNORMAL
EXPIRATION DATE: ABNORMAL
GLOBULIN UR ELPH-MCNC: 2.5 GM/DL
GLUCOSE BLDC GLUCOMTR-MCNC: 144 MG/DL (ref 70–130)
GLUCOSE SERPL-MCNC: 121 MG/DL (ref 65–99)
HBA1C MFR BLD: 8.2 % (ref 4.5–5.7)
HDLC SERPL-MCNC: 88 MG/DL (ref 40–60)
LDLC SERPL CALC-MCNC: 52 MG/DL (ref 0–100)
LDLC/HDLC SERPL: 0.61 {RATIO}
Lab: ABNORMAL
Lab: ABNORMAL
MICROALBUMIN/CREAT UR: 8.4 MG/G (ref 0–29)
POTASSIUM SERPL-SCNC: 4.1 MMOL/L (ref 3.5–5.2)
PROT SERPL-MCNC: 6.7 G/DL (ref 6–8.5)
SODIUM SERPL-SCNC: 139 MMOL/L (ref 136–145)
TRIGL SERPL-MCNC: 42 MG/DL (ref 0–150)
TSH SERPL DL<=0.05 MIU/L-ACNC: 2.88 UIU/ML (ref 0.27–4.2)
VLDLC SERPL-MCNC: 10 MG/DL (ref 5–40)

## 2024-02-06 PROCEDURE — 82570 ASSAY OF URINE CREATININE: CPT | Performed by: INTERNAL MEDICINE

## 2024-02-06 PROCEDURE — 80061 LIPID PANEL: CPT | Performed by: INTERNAL MEDICINE

## 2024-02-06 PROCEDURE — 82043 UR ALBUMIN QUANTITATIVE: CPT | Performed by: INTERNAL MEDICINE

## 2024-02-06 PROCEDURE — 80053 COMPREHEN METABOLIC PANEL: CPT | Performed by: INTERNAL MEDICINE

## 2024-02-06 PROCEDURE — 84443 ASSAY THYROID STIM HORMONE: CPT | Performed by: INTERNAL MEDICINE

## 2024-02-06 NOTE — PROGRESS NOTES
"     Office Note      Date: 2024  Patient Name: Anisha Brantley  MRN: [id number]  : 1985    Chief Complaint   Patient presents with    Diabetes       History of Present Illness:   Anisha Brantley is a 38 y.o. female who presents for Diabetes type 1.   Current RX insulin in an op5  Bg is checked 288 times per day with dexcom.  Insulin doses are adjusted frequently based upon the readings.  Patient has occasional hypoglycemia.  Patient has been using the system during the last 3 months.     The last 2 weeks of dexcom data are reviewed.  0 % are low  43 % are in range  20 % are 180-250  37 % are >250  The glycemic pattern shows: post prandial hyperglycemia       Last A1c:  Hemoglobin A1C   Date Value Ref Range Status   2024 8.2 (A) 4.5 - 5.7 % Final       Changes in health since last visit:  the past week her sugars have been higher than usual as she has been ill . Last eye exam due and advised .    Subjective         Review of Systems:   Review of Systems   Endocrine: Negative for polydipsia and polyuria.       The following portions of the patient's history were reviewed and updated as appropriate: allergies, current medications, past family history, past medical history, past social history, past surgical history, and problem list.    Objective     Visit Vitals  /64   Pulse 89   Ht 175.3 cm (69\")   Wt 68 kg (150 lb)   SpO2 99%   BMI 22.15 kg/m²           Physical Exam:  Physical Exam  Vitals reviewed.   Constitutional:       Appearance: Normal appearance. She is normal weight.   Cardiovascular:      Pulses:           Dorsalis pedis pulses are 2+ on the right side and 2+ on the left side.        Posterior tibial pulses are 2+ on the right side and 2+ on the left side.   Musculoskeletal:      Right foot: Normal range of motion. No deformity, bunion, Charcot foot, foot drop or prominent metatarsal heads.      Left foot: Normal range of motion. No deformity, bunion, Charcot foot, foot drop or " prominent metatarsal heads.   Feet:      Right foot:      Protective Sensation: 10 sites tested.  10 sites sensed.      Skin integrity: Skin integrity normal.      Toenail Condition: Right toenails are normal.      Left foot:      Protective Sensation: 10 sites tested.  10 sites sensed.      Skin integrity: Skin integrity normal.      Toenail Condition: Left toenails are normal.      Comments: Diabetic Foot Exam Performed    Neurological:      Mental Status: She is alert.          Assessment / Plan      Assessment & Plan:  Problem List Items Addressed This Visit          Other    Uncontrolled type 1 diabetes mellitus with hyperglycemia - Primary    Current Assessment & Plan     Worse. A1c up to over 8  I changed he isf and her carb ratio based upon her dexcom data.   I admonished her to remember to take her meal time boluses prior to eating not after   I will plan follow up for this complicated medical problem in 6 month         Relevant Medications    Lantus SoloStar 100 UNIT/ML injection pen    HumaLOG KwikPen 100 UNIT/ML solution pen-injector    Insulin Lispro (HumaLOG) 100 UNIT/ML injection    Other Relevant Orders    POC Glycosylated Hemoglobin (Hb A1C) (Completed)    POC Glucose, Blood (Completed)    Comprehensive Metabolic Panel    Lipid Panel    Microalbumin / Creatinine Urine Ratio - Urine, Clean Catch    TSH    Insulin pump in place         Electronically signed by :Guzman Vallejo MD   02/06/2024

## 2024-02-06 NOTE — ASSESSMENT & PLAN NOTE
Worse. A1c up to over 8  I changed he isf and her carb ratio based upon her dexcom data.   I admonished her to remember to take her meal time boluses prior to eating not after   I will plan follow up for this complicated medical problem in 6 month

## 2024-05-06 ENCOUNTER — PRIOR AUTHORIZATION (OUTPATIENT)
Dept: ENDOCRINOLOGY | Facility: CLINIC | Age: 39
End: 2024-05-06
Payer: COMMERCIAL

## 2024-05-30 ENCOUNTER — TELEPHONE (OUTPATIENT)
Dept: ENDOCRINOLOGY | Facility: CLINIC | Age: 39
End: 2024-05-30
Payer: COMMERCIAL

## 2024-05-31 RX ORDER — INSULIN PMP CART,AUT,G6/7,CNTR
1 EACH SUBCUTANEOUS
Qty: 30 EACH | Refills: 1 | Status: SHIPPED | OUTPATIENT
Start: 2024-05-31

## 2024-05-31 NOTE — TELEPHONE ENCOUNTER
Rx Refill Note  Requested Prescriptions     Pending Prescriptions Disp Refills    Insulin Disposable Pump (Omnipod 5 G6 Pods, Gen 5,) misc 30 each 3     Sig: Use 1 each Every 3 (Three) Days.      Last office visit with prescribing clinician: 2/6/2024     Next office visit with prescribing clinician: 7/30/2024

## 2024-07-16 RX ORDER — PROCHLORPERAZINE 25 MG/1
SUPPOSITORY RECTAL
Qty: 3 EACH | Refills: 2 | Status: SHIPPED | OUTPATIENT
Start: 2024-07-16

## 2024-07-16 RX ORDER — PROCHLORPERAZINE 25 MG/1
SUPPOSITORY RECTAL
Qty: 1 EACH | Refills: 3 | Status: SHIPPED | OUTPATIENT
Start: 2024-07-16

## 2024-08-08 ENCOUNTER — TELEPHONE (OUTPATIENT)
Dept: ENDOCRINOLOGY | Facility: CLINIC | Age: 39
End: 2024-08-08

## 2024-08-08 NOTE — TELEPHONE ENCOUNTER
Caller: Anisha Brantley    Relationship: Self    Best call back number: [phone]    Requested Prescriptions:   DEXCOM G6 SENSOR     Pharmacy where request should be sent:  [address] - [id number]    Last office visit with prescribing clinician: 2/6/2024   Last telemedicine visit with prescribing clinician: Visit date not found   Next office visit with prescribing clinician: 8/12/2024     Additional details provided by patient: PT HAD A SENSOR MALFUNCTION AND IS NOW OUT OF RX AND WILL BE OUT BEFORE FILL DATE. PLEASE CALL AND ADV ASAP     Does the patient have less than a 3 day supply:  [x] Yes  [] No    Would you like a call back once the refill request has been completed: [x] Yes [] No    If the office needs to give you a call back, can they leave a voicemail: [x] Yes [] No    Bernie Rizo Rep   08/08/24 15:26 EDT

## 2024-08-12 ENCOUNTER — OFFICE VISIT (OUTPATIENT)
Age: 39
End: 2024-08-12
Payer: COMMERCIAL

## 2024-08-12 VITALS
OXYGEN SATURATION: 98 % | HEART RATE: 92 BPM | DIASTOLIC BLOOD PRESSURE: 68 MMHG | SYSTOLIC BLOOD PRESSURE: 112 MMHG | HEIGHT: 69 IN | WEIGHT: 157 LBS | BODY MASS INDEX: 23.25 KG/M2

## 2024-08-12 DIAGNOSIS — E10.65 UNCONTROLLED TYPE 1 DIABETES MELLITUS WITH HYPERGLYCEMIA: Primary | ICD-10-CM

## 2024-08-12 LAB
EXPIRATION DATE: ABNORMAL
EXPIRATION DATE: ABNORMAL
GLUCOSE BLDC GLUCOMTR-MCNC: 204 MG/DL (ref 70–130)
HBA1C MFR BLD: 7.4 % (ref 4.5–5.7)
Lab: ABNORMAL
Lab: ABNORMAL

## 2024-08-12 PROCEDURE — 83036 HEMOGLOBIN GLYCOSYLATED A1C: CPT | Performed by: INTERNAL MEDICINE

## 2024-08-12 PROCEDURE — 99213 OFFICE O/P EST LOW 20 MIN: CPT | Performed by: INTERNAL MEDICINE

## 2024-08-12 PROCEDURE — 82947 ASSAY GLUCOSE BLOOD QUANT: CPT | Performed by: INTERNAL MEDICINE

## 2024-08-12 NOTE — PROGRESS NOTES
"     Office Note      Date: 2024  Patient Name: Anisha Brantley  MRN: [id number]  : 1985    Chief Complaint   Patient presents with    Diabetes       History of Present Illness:   Anisha Brantley is a 38 y.o. female who presents for Diabetes type 1.   Current RX insulin in op5 with g6   Bg is checked 288 times per day with dexcom.  Insulin doses are adjusted frequently based upon the readings.  Patient has occasional hypoglycemia.  Patient has been using the system during the last 3 months.         Last A1c:  Hemoglobin A1C   Date Value Ref Range Status   2024 7.4 (A) 4.5 - 5.7 % Final       Changes in health since last visit:  none . Last eye exam due and advised .    Subjective          Review of Systems:   Review of Systems   Endocrine: Negative for polydipsia and polyuria.       The following portions of the patient's history were reviewed and updated as appropriate: allergies, current medications, past family history, past medical history, past social history, past surgical history, and problem list.    Objective     Visit Vitals  /68 (BP Location: Left arm, Patient Position: Sitting, Cuff Size: Adult)   Pulse 92   Ht 175.3 cm (69\")   Wt 71.2 kg (157 lb)   SpO2 98%   BMI 23.18 kg/m²           Physical Exam:  Physical Exam  Vitals reviewed.   Constitutional:       Appearance: Normal appearance.   Neurological:      Mental Status: She is alert.   Psychiatric:         Mood and Affect: Mood normal.         Behavior: Behavior normal.         Thought Content: Thought content normal.         Judgment: Judgment normal.          Assessment / Plan      Assessment & Plan:  Problem List Items Addressed This Visit       Uncontrolled type 1 diabetes mellitus with hyperglycemia - Primary    Current Assessment & Plan     Diabetes is improving with treatment.   Continue current treatment regimen.  Diabetes will be reassessed in 6 months         Relevant Medications    Lantus SoloStar 100 UNIT/ML injection " pen    HumaLOG KwikPen 100 UNIT/ML solution pen-injector    Insulin Lispro (HumaLOG) 100 UNIT/ML injection    Other Relevant Orders    POC Glucose, Blood (Completed)    POC Glycosylated Hemoglobin (Hb A1C) (Completed)         Electronically signed by : Guzman Vallejo MD  08/12/2024

## 2024-09-06 ENCOUNTER — TELEPHONE (OUTPATIENT)
Dept: ENDOCRINOLOGY | Facility: CLINIC | Age: 39
End: 2024-09-06
Payer: COMMERCIAL

## 2024-09-06 NOTE — TELEPHONE ENCOUNTER
Patient had a pump malfunction - her hand held device has stopped charging and she was not able to use it   . She has a new device and I have given her setting information. She will call the omnipod for technical support with setting up a new pod. All qns answered

## 2024-10-01 ENCOUNTER — PRIOR AUTHORIZATION (OUTPATIENT)
Dept: ENDOCRINOLOGY | Facility: CLINIC | Age: 39
End: 2024-10-01
Payer: COMMERCIAL

## 2024-10-01 NOTE — TELEPHONE ENCOUNTER
Anisha York (Key: [id number])  PA Case ID #: [id number]  Rx #: [id number]  Need Help? Call us at [phone]  Outcome  Approved today by [employer] 2017  PA Case: [id number], Status: Approved, Coverage Starts on: 10/1/2024 12:00:00 AM, Coverage Ends on: 10/1/2025 12:00:00 AM.  Authorization Expiration Date: 9/30/2025  Drug  Dexcom G6 Sensor  ePA cloud logo  Form  [employer] Electronic PA Form (2017 [employer])

## 2024-10-09 ENCOUNTER — TELEPHONE (OUTPATIENT)
Dept: ENDOCRINOLOGY | Facility: CLINIC | Age: 39
End: 2024-10-09
Payer: COMMERCIAL

## 2024-10-09 NOTE — TELEPHONE ENCOUNTER
Called pt to get her payment for the forms to be filled out by Dr. Vallejo. Left vm to call back.

## 2024-10-09 NOTE — TELEPHONE ENCOUNTER
PATIENT CALLED BACK AND PAID THE $25.00 FOR THE FORMS SHE NEEDS FILLED OUT. SHE IS GOING TO TRY TO SCAN IT INTO HER CHART FOR US TO ACCESS THE FORMS. IF THAT DOES NOT WORK SHE WILL TRY TO FAX US THE FORMS.

## 2024-10-11 ENCOUNTER — TELEPHONE (OUTPATIENT)
Age: 39
End: 2024-10-11

## 2024-11-04 RX ORDER — PROCHLORPERAZINE 25 MG/1
SUPPOSITORY RECTAL
Qty: 3 EACH | Refills: 3 | Status: SHIPPED | OUTPATIENT
Start: 2024-11-04

## 2024-11-04 NOTE — TELEPHONE ENCOUNTER
Rx Refill Note  Requested Prescriptions     Pending Prescriptions Disp Refills    Continuous Glucose Sensor (Dexcom G6 Sensor) [Pharmacy Med Name: DEXCOM G6 SENSOR] 3 each 2     Sig: CHANGE SENSOR EVERY 10 DAYS      Last office visit with prescribing clinician: 8/12/2024     Next office visit with prescribing clinician: 2/14/2025

## 2024-11-20 RX ORDER — INSULIN PMP CART,AUT,G6/7,CNTR
1 EACH SUBCUTANEOUS
Qty: 30 EACH | Refills: 0 | Status: SHIPPED | OUTPATIENT
Start: 2024-11-20

## 2024-11-27 ENCOUNTER — PRIOR AUTHORIZATION (OUTPATIENT)
Dept: ENDOCRINOLOGY | Facility: CLINIC | Age: 39
End: 2024-11-27
Payer: COMMERCIAL

## 2024-12-09 RX ORDER — INSULIN LISPRO 100 [IU]/ML
INJECTION, SOLUTION INTRAVENOUS; SUBCUTANEOUS
Qty: 30 ML | Refills: 5 | Status: SHIPPED | OUTPATIENT
Start: 2024-12-09

## 2024-12-09 NOTE — TELEPHONE ENCOUNTER
Rx Refill Note  Requested Prescriptions     Pending Prescriptions Disp Refills    Insulin Lispro (humaLOG) 100 UNIT/ML injection [Pharmacy Med Name: INSULIN LISPRO 100 UNIT/ML VL] 30 mL 5     Sig: USE AS DIRECTED IN INSULIN PUMP. *MAXIMUM DAILY DOSE  UNITS*      Last office visit with prescribing clinician: 8/12/2024      Next office visit with prescribing clinician: 2/14/2025       Karina Culver MA  12/09/24, 10:49 EST

## 2025-03-20 RX ORDER — PROCHLORPERAZINE 25 MG/1
SUPPOSITORY RECTAL
Qty: 3 EACH | Refills: 3 | Status: SHIPPED | OUTPATIENT
Start: 2025-03-20

## 2025-03-20 RX ORDER — INSULIN PMP CART,AUT,G6/7,CNTR
1 EACH SUBCUTANEOUS
Qty: 30 EACH | Refills: 0 | Status: SHIPPED | OUTPATIENT
Start: 2025-03-20 | End: 2025-03-20 | Stop reason: SDUPTHER

## 2025-03-20 RX ORDER — INSULIN LISPRO 100 [IU]/ML
INJECTION, SOLUTION INTRAVENOUS; SUBCUTANEOUS
Qty: 30 ML | Refills: 5 | Status: SHIPPED | OUTPATIENT
Start: 2025-03-20

## 2025-03-20 RX ORDER — INSULIN PMP CART,AUT,G6/7,CNTR
1 EACH SUBCUTANEOUS
Qty: 30 EACH | Refills: 0 | Status: SHIPPED | OUTPATIENT
Start: 2025-03-20

## 2025-03-20 NOTE — TELEPHONE ENCOUNTER
Rx Refill Note  Requested Prescriptions     Pending Prescriptions Disp Refills    Continuous Glucose Sensor (Dexcom G6 Sensor) 3 each 3     Sig: CHANGE SENSOR EVERY 10 DAYS    Insulin Disposable Pump (Omnipod 5 [id number] Pods Gen 5) misc 30 each 0     Sig: Use 1 each Every 3 (Three) Days.    Insulin Lispro (humaLOG) 100 UNIT/ML injection 30 mL 5     Sig: USE AS DIRECTED IN INSULIN PUMP. *MAXIMUM DAILY DOSE  UNITS*      Last office visit with prescribing clinician: 8/12/2024      Next office visit with prescribing clinician: 6/23/2025       Karina Culver MA  03/20/25, 08:16 EDT

## 2025-03-25 RX ORDER — INSULIN PMP CART,AUT,G6/7,CNTR
EACH SUBCUTANEOUS
Qty: 10 EACH | OUTPATIENT
Start: 2025-03-25

## 2025-05-21 RX ORDER — INSULIN PMP CART,AUT,G6/7,CNTR
EACH SUBCUTANEOUS
Qty: 10 EACH | Refills: 2 | Status: SHIPPED | OUTPATIENT
Start: 2025-05-21

## 2025-05-21 NOTE — TELEPHONE ENCOUNTER
Rx Refill Note  Requested Prescriptions     Pending Prescriptions Disp Refills    Insulin Disposable Pump (Omnipod 5 [id number] Pods Gen 5) misc [Pharmacy Med Name: OMNIPOD 5 [id number] PODS (GEN 5)] 10 each 2     Sig: CHANGE EVERY 3 DAYS      Last office visit with prescribing clinician: 8/12/2024      Next office visit with prescribing clinician: 6/23/2025       Karina Culver MA  05/21/25, 09:18 EDT

## 2025-05-22 ENCOUNTER — TELEPHONE (OUTPATIENT)
Dept: ENDOCRINOLOGY | Facility: CLINIC | Age: 40
End: 2025-05-22
Payer: COMMERCIAL

## 2025-05-22 NOTE — TELEPHONE ENCOUNTER
Pt called stated blood sugar last night was 400 this has been going on for a few weeks not everyday. Pt has a omnipod 5 pump and thinks might needs some adjustments juancho mode not working correctly. Pt has dexcom g6 sensor she thinks the sensor has failed unsure if sensor is sharable. Insulin lispro as directed in insulin pump maximum dose 100 units.   [place] and is going to run out of Dexcom g6 sensor

## 2025-05-22 NOTE — TELEPHONE ENCOUNTER
Called patient to let her know Dr. Vallejo said to increase basal rates by 0.2 units per hour. She verbalized understanding.     She was going to discuss a few more issues going on but said she was getting sick then the call got disconnected. I did try to give her my direct call back # but not sure if she got it.     Will await her call back.

## 2025-05-22 NOTE — TELEPHONE ENCOUNTER
Spoke with patient. Patient states that she has been having trouble with her omnipod when her sensors reach the last few hours of use. She says that she has been feeling off for the last few weeks. She states that is from her high sugar. Patients omnipod is sharing with us. Patient thinks that she might need setting changes in manual mode. She would like to know Dr. Vallejo thoughts due to her about to leave for a 2 week trip.

## 2025-05-22 NOTE — TELEPHONE ENCOUNTER
Patient returned my call. She said she also wanted Dr. Vallejo to know she has noticed high blood sugar when she is on her menstrual cycle and/or when she is sick.     She wanted to add in those details and make sure he still wanted her to increase her basal rate by 0.2 units per hour.

## 2025-05-22 NOTE — TELEPHONE ENCOUNTER
Called by pt.  She notes her glucose has been in the 400 range all day.  She has been experiencing n/v today.  Can't keep down ice chips.  She wonders if she has a stomach bug and should wait for it to pass.  She changed out her OmniPod yesterday.  I told her that I suspected her current pod isn't delivering insulin and she has developed DKA.  I instructed her to go to the ER to be evaluated and treated.  I suggested that she carry an extra pod and insulin with her to the hospital as she may need change it out.

## 2025-05-28 ENCOUNTER — TELEPHONE (OUTPATIENT)
Age: 40
End: 2025-05-28

## 2025-05-28 NOTE — TELEPHONE ENCOUNTER
Hub staff attempted to follow warm transfer process and was unsuccessful     Caller: Anisha Brantley    Relationship to patient: Self    Best call back number: [phone]     Patient is needing: PT CALLED IN STATING THAT SHE WAS SUPPOSED TO COME UP AND GET OMNI POD SAMPLES. PT STATED THAT SHE HAD SPOKE TO A CECE ON THE PHONE AND THEY STATED TO COME GET SAMPLES BEFORE HER TRIP. PT WAS CHECKING TO SEE IF SHE CAN COME GET THOSE NOW. PT IS GOING ON HER TRIP ON FRIDAY. PLEASE ADVISE AND CALL BACK.

## 2025-06-06 ENCOUNTER — TELEPHONE (OUTPATIENT)
Age: 40
End: 2025-06-06

## 2025-06-06 NOTE — TELEPHONE ENCOUNTER
Called and left message for patient with omnipod customer service number to call and they would be able to assist her with the issues she is having with her omnipod.

## 2025-06-06 NOTE — TELEPHONE ENCOUNTER
Spoke with pt, she has been running in manual and did not realize it. She will resume Automated mode. Discussed post prandial hyperglycemia - encouraged pre- bolusing and accurate carb counting. Pt reports entering low carb amounts due to concern for hypoglycemia. She is going work on improving this before we make any settings adjustments.

## 2025-06-06 NOTE — TELEPHONE ENCOUNTER
Caller: Anisha Brantley    Relationship to patient: Self    Best call back number: [phone]     Patient is needing: PT IS HAVING SOME ISSUES WITH HER OMNI POD. PT IS ASKING FOR A NURSE TO CALL AND GO OVER HOW TO FIX OVER THE PHONE. PT LEAVES FOR HER TRIP TOMORROW. PT IS GOING LOW AT NIGHT PT WAS IN THE  HOSPITAL AND SOME OF HER NUMBERS WHERE CHANGED ON HER POD. PLEASE ADVISE.

## 2025-06-06 NOTE — TELEPHONE ENCOUNTER
Spoke to patient and let her know we did have an educator at the [place] office and she is going to call there.  She stated it was a calibration issue and the pump was working fine.

## 2025-08-19 RX ORDER — INSULIN PMP CART,AUT,G6/7,CNTR
EACH SUBCUTANEOUS
Qty: 10 EACH | Refills: 2 | Status: SHIPPED | OUTPATIENT
Start: 2025-08-19

## 2025-08-22 RX ORDER — PROCHLORPERAZINE 25 MG/1
SUPPOSITORY RECTAL
Qty: 1 EACH | Refills: 3 | Status: SHIPPED | OUTPATIENT
Start: 2025-08-22